# Patient Record
(demographics unavailable — no encounter records)

---

## 2024-12-12 NOTE — PHYSICAL EXAM
[de-identified] : Right ankle Physical Examination:  General: Alert and oriented x3.  In no acute distress.  Pleasant in nature with a normal affect.  No apparent respiratory distress.  Erythema, Warmth, Rubor: Negative Swelling: Negative tenderness midfoot retro calcaneal bursa  ROM: 1. Dorsiflexion: 10 degrees 2. Plantarflexion: 40 degrees 3. Inversion: 30 degrees 4. Eversion: 20 degrees 5. Subtalar: 10 degrees  Tenderness to Palpation:  1. Lateral Malleolus: Negative 2. Medial Malleolus: Negative 3. Proximal Fibular Pain: Negative 4. Heel Pain: Negative 5. Cuboid: Negative 6. Navicular: Negative 7. Tibiotalar Joint: Negative 8. Subtalar Joint: Negative 9. Posterior Recess: Negative  Tendon Pain: 1. Achilles: Negative 2. Peroneals: Negative 3. Posterior Tibialis: Negative 4. Tibialis Anterior: Negative  Ligament Pain: 1. ATFL: Negative 2. CFL: Negative  3. PTFL: Negative 4. Deltoid Ligaments: Negative 5. Lis Franc Ligament: Negative  Stability:  1. Anterior Drawer: Negative 2. Posterior Drawer: Negative  Strength: 5/5 TA/GS/EHL  Pulses: 2+ DP/PT Pulses  Neuro: Intact motor and sensory  Additional Test: 1. Calcaneal Squeeze Test: Negative 2. Syndesmosis Squeeze Test: Negative [de-identified] : 3 views x-rays right ankle reviewed, 12/12/2024: No fractures present. Bone spurs noted

## 2024-12-12 NOTE — DISCUSSION/SUMMARY
[de-identified] :  Today I had a lengthy discussion with the patient regarding their right ankle pain. I have addressed all the patient's concerns surrounding the pathology of their condition.  Assessment: Right foot Achilles tendinosis   Plan: #1. Anti-inflammatories/Tylenol as needed for pain. #2. Home stretching program/physical therapy prescription given. #3. Dr. Mcmanus's insoles/gel heel cups. #4. Epsom Salt Baths daily. #5. Dorsal Night Splint. Use as instructed/as directed. #6. All questions answered. The patient understood the treatment plan above.  FU in 6-8 weeks  The patient understood and verbally agreed to the treatment plan. All of their questions were answered and they were satisfied with the visit. The patient should call the office if they have any questions or experience worsening symptoms.

## 2024-12-12 NOTE — HISTORY OF PRESENT ILLNESS
[FreeTextEntry1] : The patient is a 22-year-old male who presents with right ankle pain.  The right ankle pain has stemmed from being active in sports over the years, specifically running. Patient has asthma. The patient presents to the office in sneakers and ambulating without assistance.

## 2024-12-12 NOTE — ADDENDUM
[FreeTextEntry1] : I, Andres Cummins, acted solely as a scribe for Dr. Andres Jaime on this date 12/12/2024  .   All medical record entries made by the Scribe were at my, Dr. Andres Jaime, direction and personally dictated by me on 12/12/2024 . I have reviewed the chart and agree that the record accurately reflects my personal performance of the history, physical exam, assessment and plan. I have also personally directed, reviewed, and agreed with the chart.

## 2025-06-24 NOTE — HISTORY OF PRESENT ILLNESS
[de-identified] : 6.24.25 Patient here for right knee pain. Patient states pain started 2 weeks ago. Patient states he fell while playing soccer